# Patient Record
Sex: FEMALE | Race: WHITE | NOT HISPANIC OR LATINO | Employment: FULL TIME | URBAN - METROPOLITAN AREA
[De-identification: names, ages, dates, MRNs, and addresses within clinical notes are randomized per-mention and may not be internally consistent; named-entity substitution may affect disease eponyms.]

---

## 2025-08-01 ENCOUNTER — OFFICE VISIT (OUTPATIENT)
Dept: URGENT CARE | Age: 30
End: 2025-08-01
Payer: COMMERCIAL

## 2025-08-01 ENCOUNTER — HOSPITAL ENCOUNTER (EMERGENCY)
Facility: HOSPITAL | Age: 30
Discharge: HOME | End: 2025-08-01
Payer: COMMERCIAL

## 2025-08-01 ENCOUNTER — APPOINTMENT (OUTPATIENT)
Dept: RADIOLOGY | Facility: HOSPITAL | Age: 30
End: 2025-08-01
Payer: COMMERCIAL

## 2025-08-01 VITALS
SYSTOLIC BLOOD PRESSURE: 112 MMHG | HEIGHT: 70 IN | WEIGHT: 170 LBS | BODY MASS INDEX: 24.34 KG/M2 | HEART RATE: 83 BPM | OXYGEN SATURATION: 97 % | TEMPERATURE: 97.7 F | RESPIRATION RATE: 18 BRPM | DIASTOLIC BLOOD PRESSURE: 61 MMHG

## 2025-08-01 VITALS
BODY MASS INDEX: 24.34 KG/M2 | WEIGHT: 170 LBS | DIASTOLIC BLOOD PRESSURE: 86 MMHG | TEMPERATURE: 98.6 F | HEART RATE: 77 BPM | SYSTOLIC BLOOD PRESSURE: 129 MMHG | OXYGEN SATURATION: 99 % | HEIGHT: 70 IN | RESPIRATION RATE: 16 BRPM

## 2025-08-01 DIAGNOSIS — M79.605 PAIN OF LEFT LOWER EXTREMITY: Primary | ICD-10-CM

## 2025-08-01 DIAGNOSIS — I83.892 VARICOSE VEINS OF LEFT LOWER EXTREMITY WITH EDEMA: Primary | ICD-10-CM

## 2025-08-01 PROCEDURE — 99284 EMERGENCY DEPT VISIT MOD MDM: CPT | Mod: 25

## 2025-08-01 PROCEDURE — 93971 EXTREMITY STUDY: CPT

## 2025-08-01 PROCEDURE — 93971 EXTREMITY STUDY: CPT | Performed by: STUDENT IN AN ORGANIZED HEALTH CARE EDUCATION/TRAINING PROGRAM

## 2025-08-01 RX ORDER — BUDESONIDE 0.5 MG/2ML
0.5 INHALANT ORAL 2 TIMES DAILY
COMMUNITY
Start: 2024-05-01

## 2025-08-01 RX ORDER — BUDESONIDE AND FORMOTEROL FUMARATE DIHYDRATE 160; 4.5 UG/1; UG/1
2 AEROSOL RESPIRATORY (INHALATION) 2 TIMES DAILY
COMMUNITY
Start: 2024-11-06

## 2025-08-01 RX ORDER — FAMOTIDINE 20 MG/1
20 TABLET, FILM COATED ORAL 2 TIMES DAILY PRN
COMMUNITY
Start: 2024-09-03

## 2025-08-01 RX ORDER — DUPILUMAB 300 MG/2ML
1 INJECTION, SOLUTION SUBCUTANEOUS
COMMUNITY
Start: 2025-05-28

## 2025-08-01 RX ORDER — HYDROCORTISONE 25 MG/G
1 CREAM TOPICAL 2 TIMES DAILY
COMMUNITY
Start: 2023-12-28

## 2025-08-01 RX ORDER — BUSPIRONE HYDROCHLORIDE 5 MG/1
5 TABLET ORAL 2 TIMES DAILY
COMMUNITY
Start: 2025-07-25

## 2025-08-01 RX ORDER — ALBUTEROL SULFATE 90 UG/1
2 INHALANT RESPIRATORY (INHALATION) EVERY 4 HOURS PRN
COMMUNITY
Start: 2025-05-28

## 2025-08-01 RX ORDER — MOMETASONE FUROATE AND FORMOTEROL FUMARATE DIHYDRATE 200; 5 UG/1; UG/1
AEROSOL RESPIRATORY (INHALATION)
COMMUNITY
Start: 2017-11-27

## 2025-08-01 RX ORDER — AZELASTINE 1 MG/ML
2 SPRAY, METERED NASAL 2 TIMES DAILY
COMMUNITY
Start: 2025-05-28

## 2025-08-01 RX ORDER — IPRATROPIUM BROMIDE 42 UG/1
SPRAY, METERED NASAL
COMMUNITY
Start: 2018-04-03

## 2025-08-01 RX ORDER — MONTELUKAST SODIUM 10 MG/1
10 TABLET ORAL
COMMUNITY
Start: 2025-05-28

## 2025-08-01 RX ORDER — FLUTICASONE PROPIONATE AND SALMETEROL 500; 50 UG/1; UG/1
POWDER RESPIRATORY (INHALATION)
COMMUNITY
Start: 2025-04-27

## 2025-08-01 RX ORDER — FLUCONAZOLE 150 MG/1
TABLET ORAL
COMMUNITY
Start: 2025-01-15

## 2025-08-01 RX ORDER — AZELASTINE HYDROCHLORIDE, FLUTICASONE PROPIONATE 137; 50 UG/1; UG/1
SPRAY, METERED NASAL
COMMUNITY

## 2025-08-01 ASSESSMENT — PAIN - FUNCTIONAL ASSESSMENT: PAIN_FUNCTIONAL_ASSESSMENT: 0-10

## 2025-08-01 ASSESSMENT — PATIENT HEALTH QUESTIONNAIRE - PHQ9
SUM OF ALL RESPONSES TO PHQ9 QUESTIONS 1 AND 2: 0
1. LITTLE INTEREST OR PLEASURE IN DOING THINGS: NOT AT ALL
2. FEELING DOWN, DEPRESSED OR HOPELESS: NOT AT ALL

## 2025-08-01 ASSESSMENT — PAIN DESCRIPTION - ORIENTATION: ORIENTATION: LEFT

## 2025-08-01 ASSESSMENT — PAIN SCALES - GENERAL: PAINLEVEL_OUTOF10: 5 - MODERATE PAIN

## 2025-08-01 ASSESSMENT — PAIN DESCRIPTION - PAIN TYPE: TYPE: ACUTE PAIN

## 2025-08-01 ASSESSMENT — PAIN DESCRIPTION - DESCRIPTORS: DESCRIPTORS: ACHING

## 2025-08-01 NOTE — PROGRESS NOTES
"Subjective   Patient ID: Pamela Her is a 30 y.o. female. They present today with a chief complaint of Other (Patient states that has upper left side calf discomfort going up to inner thigh, lump tender to touch ).    History of Present Illness  HPI   30-year-old patient presents to clinic with complaints of tenderness and edema of medial proximal  left leg with varicose  veins present for the past week and over the past 2 to 3 days has become tender at the medial left thigh and yesterday noted pain has continued to move up to the proximal thigh and there is noted  lumps  in the area with mild edema.  Reports is on oral contraceptives.  Reports was on a 12  hour car ride x 1 week ago.  Denies numbness, tingling, dizziness, shortness of breath.  Past Medical History  Allergies as of 08/01/2025    (No Known Allergies)       Prescriptions Prior to Admission[1]     Medical History[2]    Surgical History[3]     reports that she has never smoked. She has never used smokeless tobacco. She reports current alcohol use. She reports that she does not use drugs.    Review of Systems  Review of Systems     ROS negative with the exception as noted on HPI                           Objective    Vitals:    08/01/25 1940   BP: 129/86   Pulse: 77   Resp: 16   Temp: 37 °C (98.6 °F)   SpO2: 99%   Weight: 77.1 kg (170 lb)   Height: 1.778 m (5' 10\")     Patient's last menstrual period was 07/11/2025.    Physical Exam  Constitutional:       Appearance: Normal appearance.   HENT:      Head: Normocephalic and atraumatic.     Cardiovascular:      Rate and Rhythm: Normal rate and regular rhythm.      Pulses: Normal pulses.      Heart sounds: Normal heart sounds.   Pulmonary:      Effort: Pulmonary effort is normal. No respiratory distress.      Breath sounds: Normal breath sounds. No stridor. No wheezing, rhonchi or rales.     Musculoskeletal:      Right upper leg: No swelling, tenderness or bony tenderness.      Left upper leg: Swelling " (mild edema with vericose veins noted at medial proximal leg with palpable lumps but not quite a cord at medial thigh which is tender to palpation) and tenderness present. No bony tenderness.      Right lower leg: No swelling, tenderness or bony tenderness.      Left lower leg: Swelling (mild edema with vericose veins noted at medial proximal leg with palpable lumps but not quite a cord at medial thigh which is tender to palpation) and tenderness present. No bony tenderness.      Comments: Homans is positive on the left.      Neurological:      Mental Status: She is alert.      Sensory: No sensory deficit.      Motor: No weakness.         Procedures    Point of Care Test & Imaging Results from this visit  No results found for this visit on 08/01/25.   Imaging  No results found.    Cardiology, Vascular, and Other Imaging  No other imaging results found for the past 2 days      Diagnostic study results (if any) were reviewed by Sara Dyer PA-C.    Assessment/Plan   Allergies, medications, history, and pertinent labs/EKGs/Imaging reviewed by Sara Dyer PA-C.   tenderness and edema of medial proximal  left leg with varicose  veins present for the past week and over the past 2 to 3 days has become tender at the medial left thigh and yesterday noted pain has continued to move up to the proximal thigh and there is noted  lumps  in the area with mild edema.   Homans is positive on the left.   There is palpable lumps but not quite a cord present at medial thigh which is tender to palpation.   Discussed with patient given long car ride with oral contraceptives and current symptoms DVT needs to be ruled out.   Patient is advised to proceed to the ED immediately for DVT rule out.  Patient agreed will proceed to [facility].  Medical Decision Making      Orders and Diagnoses  Diagnoses and all orders for this visit:  Varicose veins of left lower extremity with edema      Medical Admin Record      Patient  disposition: ED. Transported by: Private Vehicle.    Electronically signed by Sara Dyer PA-C  7:50 PM           [1] (Not in a hospital admission)   [2]   Past Medical History:  Diagnosis Date    Acute maxillary sinusitis, unspecified 09/30/2016    Acute maxillary sinusitis    Encounter for initial prescription of contraceptive pills 08/18/2015    Encounter for initial prescription of contraceptive pills    Other conditions influencing health status 08/09/2013    Dermoid Cyst Of Skin    Personal history of other diseases of the respiratory system 05/02/2016    History of pharyngitis    Personal history of other mental and behavioral disorders 07/16/2015    History of anxiety    Personal history of other specified conditions 12/14/2016    History of motion sickness   [3] No past surgical history on file.

## 2025-08-01 NOTE — PATIENT INSTRUCTIONS
PROCEED TO THE ED IMMEDIATELY FOR DVT RULE OUT. PT. HAS PALPABLE LUMPS-NOT QUITE CORD BUT PRESENT AT MEDIAL LEFT THIGH  WITH MILD EDEMA. PT. HAD A 12 HOURS CAR RIDE X 1 WEEK AGO AND IS ON ORAL CONTRACEPTIVES.

## 2025-08-02 NOTE — DISCHARGE INSTRUCTIONS
Please follow-up with your PCP in the next 2 to 3 days for reassessment.  Is unclear what is causing your pain.  No evidence of DVT on your ultrasound.  You can try ibuprofen for pain control.  Return to ER if symptoms change or worsen

## 2025-08-02 NOTE — ED PROVIDER NOTES
Limitations to History: none  External Records Reviewed  Independent Historians: self  Social determinants affecting care: none    HPI  Pamela Her is a 30 y.o. female who presents emergency department due to left leg pain for the last few weeks.  She reports initially she was having pain to her left medial calf.  She reports that she believes she has some varicose veins and sometimes they become painful.  Over the last few days she developed pain to her left posterior calf and noticed that was more swollen.  She went to urgent care who recommended she come to the ER to rule out DVT.  She does report that she recently was on a 12-hour car ride and she is also on OCPs.  She denies any chest pains or shortness of breath.  She denies trauma or injury to her leg.  She denies any numbness or tingling to the leg.  She has not had fever or chills.  She has no further complaints    [facility]  Medical History[1] reviewed by myself.    Meds  Current Outpatient Medications   Medication Instructions    albuterol 90 mcg/actuation inhaler 2 puffs, Every 4 hours PRN    azelastine (Astelin) 137 mcg (0.1 %) nasal spray 2 sprays, 2 times daily    azelastine-fluticasone (Dymista) 137-50 mcg/spray nasal spray Administer into affected nostril(s).    budesonide (PULMICORT) 0.5 mg, 2 times daily    budesonide-formoterol (Symbicort) 160-4.5 mcg/actuation inhaler 2 puffs, 2 times daily    busPIRone (BUSPAR) 5 mg, 2 times daily    Dupixent Pen 300 mg/2 mL pen injector 1 Pen, Every 14 days    famotidine (PEPCID) 20 mg, 2 times daily PRN    fluconazole (Diflucan) 150 mg tablet TAKE 1 TABLET BY MOUTH ONCE FOR 1 DOSE, MAY REPEAT IN 3 DAYS IF SYMPTOMS PERSIST    fluticasone propion-salmeteroL (Advair Diskus) 500-50 mcg/dose diskus inhaler INHALE 1 PUFF AS INSTRUCTED TWO TIMES A DAY. RINSE AND GARGLE MOUTH WITH WATER AFTER EACH USE.    fluticasone propionate 93 mcg/actuation aerosol breath activated 2 sprays, 2 times daily    hydrocortisone 2.5 %  "cream 1 Application, 2 times daily    ipratropium (Atrovent) 42 mcg (0.06 %) nasal spray Administer into affected nostril(s).    mometasone-formoterol (Dulera) 200-5 mcg/actuation inhaler Inhale.    montelukast (SINGULAIR) 10 mg, Daily RT       Allergies  RX Allergies[2] reviewed by myself.    SHx  Social History[3] reviewed by myself.      ------------------------------------------------------------------------------------------------------------------------------------------    BP (!) 130/94 (BP Location: Right arm, Patient Position: Sitting)   Pulse 81   Temp 36.5 °C (97.7 °F)   Resp 18   Ht 1.778 m (5' 10\")   Wt 77.1 kg (170 lb)   LMP 07/11/2025   SpO2 98%   BMI 24.39 kg/m²     Physical Exam  Vitals and nursing note reviewed.   Constitutional:       Appearance: Normal appearance. She is normal weight.   HENT:      Head: Normocephalic.      Nose: Nose normal.      Mouth/Throat:      Mouth: Mucous membranes are moist.     Eyes:      Extraocular Movements: Extraocular movements intact.      Conjunctiva/sclera: Conjunctivae normal.       Cardiovascular:      Rate and Rhythm: Normal rate and regular rhythm.   Pulmonary:      Effort: Pulmonary effort is normal.      Breath sounds: Normal breath sounds.     Musculoskeletal:         General: Normal range of motion.      Cervical back: Neck supple.      Comments: Slight tenderness palpation of the left medial calf and of the left medial thigh.  There is slight swelling to the left medial thigh.  Compartments are soft and compressible to the left lower extremity.  Sensation is intact distally.  Capillary fill brisk.  Dorsalis pedis pulse +2/4 on the left.  No erythema, ecchymosis, or open wound to the left lower extremity     Skin:     General: Skin is warm and dry.      Comments: No fluctuance or skin changes to suggest abscess to the left thigh     Neurological:      Mental Status: She is alert and oriented to person, place, and time.     Psychiatric:         " Attention and Perception: Attention normal.         Mood and Affect: Mood normal.          ------------------------------------------------------------------------------------------------------------------------------------------  Labs  Labs Reviewed - No data to display     Imaging  Lower extremity venous duplex left   Final Result   No evidence of acute DVT in the left lower extremity.             MACRO:   None.        Signed by: Abdulkadir Green 8/1/2025 10:21 PM   Dictation workstation:   [id number]           ED Course  Diagnoses as of 08/01/25 2242   Pain of left lower extremity        Medical Decision Making: She not appear ill or toxic.  Vital signs reviewed and stable.  Will obtain duplex ultrasound to rule out DVT.  Offered analgesics in the    Differential diagnoses considered: DVT, varicose veins, musculoskeletal pain, others    Independent Result Review and Interpretation: Relevant radiographic results were reviewed and independently interpreted by myself.  As necessary, they are commented on in the ED Course.     Chronic conditions affecting the patient's care: As documented above in [facility]     The patient was discussed with the following consultants/services: None     Care Considerations: As documented above in [facility]    Duplex ultrasound negative for DVT.  I discussed with the patient about the workup today.  Is unclear what is causing her pain.  I do recommend she follow-up with her PCP next week.  She can try using compression to help with the swelling.  She can take ibuprofen or Tylenol for pain.  She is to return to ER immediately if she has increased pain, numbness, redness, fevers, or any changes.  She verbalized understanding and agreed to plan of care.  She was discharged home in stable condition.    Diagnosis: Left leg pain  Plan: Discharge       [1]   Past Medical History:  Diagnosis Date    Acute maxillary sinusitis, unspecified 09/30/2016    Acute maxillary sinusitis    Encounter for initial  prescription of contraceptive pills 08/18/2015    Encounter for initial prescription of contraceptive pills    Other conditions influencing health status 08/09/2013    Dermoid Cyst Of Skin    Personal history of other diseases of the respiratory system 05/02/2016    History of pharyngitis    Personal history of other mental and behavioral disorders 07/16/2015    History of anxiety    Personal history of other specified conditions 12/14/2016    History of motion sickness   [2] No Known Allergies  [3]   Social History  Tobacco Use    Smoking status: Never    Smokeless tobacco: Never   Vaping Use    Vaping status: Never Used   Substance Use Topics    Alcohol use: Yes    Drug use: Never        Adeel Vasquez PA-C  08/01/25 3339

## 2025-08-02 NOTE — ED TRIAGE NOTES
Patient has bump on the back of the left thigh. States that it started the lower calf and moved up to back of her thigh. Endorsing 12 hour car ride within the last week and also takes birth control. Concern for DVT